# Patient Record
Sex: FEMALE | Race: WHITE | NOT HISPANIC OR LATINO | URBAN - METROPOLITAN AREA
[De-identification: names, ages, dates, MRNs, and addresses within clinical notes are randomized per-mention and may not be internally consistent; named-entity substitution may affect disease eponyms.]

---

## 2024-10-01 ENCOUNTER — EMERGENCY (EMERGENCY)
Facility: HOSPITAL | Age: 19
LOS: 0 days | Discharge: HOME | End: 2024-10-02
Attending: EMERGENCY MEDICINE
Payer: MEDICAID

## 2024-10-01 VITALS
DIASTOLIC BLOOD PRESSURE: 64 MMHG | TEMPERATURE: 99 F | RESPIRATION RATE: 18 BRPM | WEIGHT: 119.93 LBS | SYSTOLIC BLOOD PRESSURE: 105 MMHG | OXYGEN SATURATION: 99 % | HEIGHT: 66 IN | HEART RATE: 118 BPM

## 2024-10-01 DIAGNOSIS — K56.1 INTUSSUSCEPTION: ICD-10-CM

## 2024-10-01 DIAGNOSIS — R11.2 NAUSEA WITH VOMITING, UNSPECIFIED: ICD-10-CM

## 2024-10-01 DIAGNOSIS — R19.7 DIARRHEA, UNSPECIFIED: ICD-10-CM

## 2024-10-01 LAB
ALBUMIN SERPL ELPH-MCNC: 4.8 G/DL — SIGNIFICANT CHANGE UP (ref 3.5–5.2)
ALP SERPL-CCNC: 67 U/L — SIGNIFICANT CHANGE UP (ref 30–115)
ALT FLD-CCNC: 11 U/L — LOW (ref 14–37)
ANION GAP SERPL CALC-SCNC: 11 MMOL/L — SIGNIFICANT CHANGE UP (ref 7–14)
AST SERPL-CCNC: 16 U/L — SIGNIFICANT CHANGE UP (ref 14–37)
BASOPHILS # BLD AUTO: 0.01 K/UL — SIGNIFICANT CHANGE UP (ref 0–0.2)
BASOPHILS NFR BLD AUTO: 0.1 % — SIGNIFICANT CHANGE UP (ref 0–1)
BILIRUB SERPL-MCNC: 1 MG/DL — SIGNIFICANT CHANGE UP (ref 0.2–1.2)
BUN SERPL-MCNC: 14 MG/DL — SIGNIFICANT CHANGE UP (ref 10–20)
CALCIUM SERPL-MCNC: 9.2 MG/DL — SIGNIFICANT CHANGE UP (ref 8.4–10.5)
CHLORIDE SERPL-SCNC: 97 MMOL/L — LOW (ref 98–110)
CO2 SERPL-SCNC: 28 MMOL/L — SIGNIFICANT CHANGE UP (ref 17–32)
CREAT SERPL-MCNC: 0.8 MG/DL — SIGNIFICANT CHANGE UP (ref 0.3–1)
EGFR: 109 ML/MIN/1.73M2 — SIGNIFICANT CHANGE UP
EOSINOPHIL # BLD AUTO: 0.09 K/UL — SIGNIFICANT CHANGE UP (ref 0–0.7)
EOSINOPHIL NFR BLD AUTO: 1 % — SIGNIFICANT CHANGE UP (ref 0–8)
GLUCOSE SERPL-MCNC: 96 MG/DL — SIGNIFICANT CHANGE UP (ref 70–99)
HCT VFR BLD CALC: 37.7 % — SIGNIFICANT CHANGE UP (ref 37–47)
HGB BLD-MCNC: 12.6 G/DL — SIGNIFICANT CHANGE UP (ref 12–16)
IMM GRANULOCYTES NFR BLD AUTO: 0.2 % — SIGNIFICANT CHANGE UP (ref 0.1–0.3)
LYMPHOCYTES # BLD AUTO: 0.95 K/UL — LOW (ref 1.2–3.4)
LYMPHOCYTES # BLD AUTO: 10.8 % — LOW (ref 20.5–51.1)
MCHC RBC-ENTMCNC: 27.6 PG — SIGNIFICANT CHANGE UP (ref 27–31)
MCHC RBC-ENTMCNC: 33.4 G/DL — SIGNIFICANT CHANGE UP (ref 32–37)
MCV RBC AUTO: 82.7 FL — SIGNIFICANT CHANGE UP (ref 81–99)
MONOCYTES # BLD AUTO: 0.53 K/UL — SIGNIFICANT CHANGE UP (ref 0.1–0.6)
MONOCYTES NFR BLD AUTO: 6 % — SIGNIFICANT CHANGE UP (ref 1.7–9.3)
NEUTROPHILS # BLD AUTO: 7.19 K/UL — HIGH (ref 1.4–6.5)
NEUTROPHILS NFR BLD AUTO: 81.9 % — HIGH (ref 42.2–75.2)
NRBC # BLD: 0 /100 WBCS — SIGNIFICANT CHANGE UP (ref 0–0)
PLATELET # BLD AUTO: 333 K/UL — SIGNIFICANT CHANGE UP (ref 130–400)
PMV BLD: 9.5 FL — SIGNIFICANT CHANGE UP (ref 7.4–10.4)
POTASSIUM SERPL-MCNC: 3.7 MMOL/L — SIGNIFICANT CHANGE UP (ref 3.5–5)
POTASSIUM SERPL-SCNC: 3.7 MMOL/L — SIGNIFICANT CHANGE UP (ref 3.5–5)
PROT SERPL-MCNC: 7.6 G/DL — SIGNIFICANT CHANGE UP (ref 6.1–8)
RBC # BLD: 4.56 M/UL — SIGNIFICANT CHANGE UP (ref 4.2–5.4)
RBC # FLD: 13.2 % — SIGNIFICANT CHANGE UP (ref 11.5–14.5)
SODIUM SERPL-SCNC: 136 MMOL/L — SIGNIFICANT CHANGE UP (ref 135–146)
WBC # BLD: 8.79 K/UL — SIGNIFICANT CHANGE UP (ref 4.8–10.8)
WBC # FLD AUTO: 8.79 K/UL — SIGNIFICANT CHANGE UP (ref 4.8–10.8)

## 2024-10-01 PROCEDURE — 96375 TX/PRO/DX INJ NEW DRUG ADDON: CPT

## 2024-10-01 PROCEDURE — 84703 CHORIONIC GONADOTROPIN ASSAY: CPT

## 2024-10-01 PROCEDURE — 99285 EMERGENCY DEPT VISIT HI MDM: CPT | Mod: 25

## 2024-10-01 PROCEDURE — 74177 CT ABD & PELVIS W/CONTRAST: CPT | Mod: MC

## 2024-10-01 PROCEDURE — 80053 COMPREHEN METABOLIC PANEL: CPT

## 2024-10-01 PROCEDURE — 36415 COLL VENOUS BLD VENIPUNCTURE: CPT

## 2024-10-01 PROCEDURE — 96374 THER/PROPH/DIAG INJ IV PUSH: CPT | Mod: XU

## 2024-10-01 PROCEDURE — 99284 EMERGENCY DEPT VISIT MOD MDM: CPT | Mod: 25

## 2024-10-01 PROCEDURE — 85025 COMPLETE CBC W/AUTO DIFF WBC: CPT

## 2024-10-01 PROCEDURE — 83690 ASSAY OF LIPASE: CPT

## 2024-10-01 RX ORDER — SODIUM CHLORIDE 9 MG/ML
1000 INJECTION INTRAMUSCULAR; INTRAVENOUS; SUBCUTANEOUS ONCE
Refills: 0 | Status: COMPLETED | OUTPATIENT
Start: 2024-10-01 | End: 2024-10-01

## 2024-10-01 RX ORDER — ONDANSETRON 2 MG/ML
4 INJECTION, SOLUTION INTRAMUSCULAR; INTRAVENOUS ONCE
Refills: 0 | Status: COMPLETED | OUTPATIENT
Start: 2024-10-01 | End: 2024-10-01

## 2024-10-01 RX ORDER — KETOROLAC TROMETHAMINE 30 MG/ML
15 INJECTION, SOLUTION INTRAMUSCULAR ONCE
Refills: 0 | Status: DISCONTINUED | OUTPATIENT
Start: 2024-10-01 | End: 2024-10-01

## 2024-10-01 RX ADMIN — KETOROLAC TROMETHAMINE 15 MILLIGRAM(S): 30 INJECTION, SOLUTION INTRAMUSCULAR at 23:37

## 2024-10-01 RX ADMIN — ONDANSETRON 4 MILLIGRAM(S): 2 INJECTION, SOLUTION INTRAMUSCULAR; INTRAVENOUS at 23:37

## 2024-10-01 RX ADMIN — SODIUM CHLORIDE 1000 MILLILITER(S): 9 INJECTION INTRAMUSCULAR; INTRAVENOUS; SUBCUTANEOUS at 23:38

## 2024-10-01 NOTE — ED PROVIDER NOTE - CARE PROVIDER_API CALL
Huang Arnold  Surgery  [address] [place] [id number]  Phone: [phone]  Fax: [phone]  Follow Up Time:

## 2024-10-01 NOTE — ED PROVIDER NOTE - OBJECTIVE STATEMENT
18 y.o. female no PMH presenting with diffuse abdominal pain that onset this morning with associated nausea, 5 episodes of vomiting, diarrhea, and chills. Patient states she took Tylenol with no relief of pain. Has associated weakness and decreased appetite. Patient denies any sick contacts or recent travel.

## 2024-10-01 NOTE — ED PROVIDER NOTE - PATIENT PORTAL LINK FT
You can access the FollowMyHealth Patient Portal offered by [facility] by registering at the following website: [email or web address]. By joining [employer]’s FollowMyHealth portal, you will also be able to view your health information using other applications (apps) compatible with our system.

## 2024-10-01 NOTE — ED PROVIDER NOTE - CLINICAL SUMMARY MEDICAL DECISION MAKING FREE TEXT BOX
18-year-old female presents with abdominal pain since this morning associate with nausea vomiting x 5 diarrhea chills.  Patient felt better after symptomatic treatment however on repeat abdominal exam patient was tender right lower quadrant left lower quadrant mild tolerated p.o.,  CTAP Short segment intussusception, small bowel in the mid left abdomen. Otherwise, no CT evidence of an acute intra-abdominal abnormality.  surgery consulted - will reeval I in ED

## 2024-10-01 NOTE — ED PROVIDER NOTE - PHYSICAL EXAMINATION
Physical Exam    Vital Signs: I have reviewed the initial vital signs.  Constitutional: well-nourished, appears stated age, no acute distress  Eyes: Conjunctiva pink, Sclera clear, PERRLA, EOMI.  Cardiovascular: S1 and S2, regular rate, regular rhythm, well-perfused extremities, radial pulses equal and 2+  Respiratory: unlabored respiratory effort, clear to auscultation bilaterally no wheezing, rales and rhonchi  Gastrointestinal: soft, diffusely tender abdomen, no pulsatile mass, normal bowel sounds  Musculoskeletal: supple neck, no lower extremity edema, no midline tenderness  Integumentary: warm, dry, no rash  Neurologic: awake, alert, cranial nerves II-XII grossly intact, extremities’ motor and sensory functions grossly intact  Psychiatric: appropriate mood, appropriate affect

## 2024-10-01 NOTE — ED PROVIDER NOTE - PROGRESS NOTE DETAILS
Patient reports improved nausea tolerating PO intake. Patient now reporting abdominal pain localized to the RLQ, Will obtain CT Abdomen and Pelvis. Seen by surgery BARB Golden discussed with attending Donny, patient still with mild tenderness.   He recommends keeping patient in the emergency room , and surgery will reevaluate if  will admit

## 2024-10-02 VITALS
HEART RATE: 99 BPM | SYSTOLIC BLOOD PRESSURE: 110 MMHG | DIASTOLIC BLOOD PRESSURE: 70 MMHG | RESPIRATION RATE: 18 BRPM | OXYGEN SATURATION: 100 %

## 2024-10-02 LAB
HCG SERPL QL: NEGATIVE — SIGNIFICANT CHANGE UP
LIDOCAIN IGE QN: 32 U/L — SIGNIFICANT CHANGE UP (ref 7–60)

## 2024-10-02 PROCEDURE — 74177 CT ABD & PELVIS W/CONTRAST: CPT | Mod: 26,MC

## 2024-10-02 PROCEDURE — 99223 1ST HOSP IP/OBS HIGH 75: CPT

## 2024-10-02 RX ORDER — IOHEXOL 350 MG/ML
30 INJECTION, SOLUTION INTRAVENOUS ONCE
Refills: 0 | Status: COMPLETED | OUTPATIENT
Start: 2024-10-02 | End: 2024-10-02

## 2024-10-02 RX ADMIN — IOHEXOL 30 MILLILITER(S): 350 INJECTION, SOLUTION INTRAVENOUS at 00:56

## 2024-10-02 NOTE — CONSULT NOTE ADULT - NS ATTEND AMEND GEN_ALL_CORE FT
The patient has some mild abdominal pain but it sounds like she has gastroenteritis.  I do not think there is any surgical indication at this time and there is no specific reason she should have IV antibiotics although this may be a consideration.  She reports to me a history of some mild indigestion but has not had any further workup.  I personally reviewed the CT images and discussed the case with the radiologist.  It certainly appears to be a small transient occurrence and there is no evidence of obstruction.  This can be a typical finding with gastroenteritis.  I do not think that any more studies will change what we do.  She should undergo a diet trial and if that is okay then she may safely be discharged with follow-up in primary care and with myself if needed.  She may benefit from a GI workup and possibly even capsule endoscopy however if there is evidence of obstruction then capsule endoscopy may not be a wise choice.

## 2024-10-02 NOTE — ED ADULT NURSE NOTE - NSFALLUNIVINTERV_ED_ALL_ED
Bed/Stretcher in lowest position, wheels locked, appropriate side rails in place/Call bell, personal items and telephone in reach/Instruct patient to call for assistance before getting out of bed/chair/stretcher/Non-slip footwear applied when patient is off stretcher/[place] to call system/Physically safe environment - no spills, clutter or unnecessary equipment/Purposeful proactive rounding/Room/bathroom lighting operational, light cord in reach

## 2024-10-02 NOTE — CONSULT NOTE ADULT - ASSESSMENT
18 y.o. female with no Sig. PMH presenting with diffuse abdominal pain that started around  9am yesterday with associated nausea, 5 episodes of vomiting, diarrhea, and chills. Patient states she took Tylenol with no relief of pain. Has associated weakness and decreased appetite. Patient denies fever, any sick contacts or recent travel. Pt also had one BM at the ED (diarrhea). Father at bedside.   Pt states, pain has gotten better after pain med but  on exam, mostly LLQ and lower mid abdomen below the umbilicus.     At the ED, work up showed; WBC 8.7, Afebrile, CT A/P w/ PO/IV cont: Short segment intussusception, small bowel in the mid left abdomen.    A/P:  Discussed with Dr. Arnold.  - NPO  - IV hydration  - Will keep at the ED for an observation   - Serial abdominal exam   - Surgery will re-examine pt

## 2024-10-02 NOTE — CONSULT NOTE ADULT - SUBJECTIVE AND OBJECTIVE BOX
18 y.o. female with no Sig. PMH presenting with diffuse abdominal pain that started around  9am yesterday with associated nausea, 5 episodes of vomiting, diarrhea, and chills. Patient states she took Tylenol with no relief of pain. Has associated weakness and decreased appetite. Patient denies fever, any sick contacts or recent travel. Pt also had one BM at the ED (diarrhea). Father at bedside.     Vital Signs Last 24 Hrs  T(C): 36.9 (02 Oct 2024 06:15), Max: 37.1 (01 Oct 2024 22:03)  T(F): 98.4 (02 Oct 2024 06:15), Max: 98.7 (01 Oct 2024 22:03)  HR: 100 (02 Oct 2024 06:15) (100 - 118)  BP: 100/69 (02 Oct 2024 06:15) (100/69 - 105/64)  BP(mean): --  RR: 18 (01 Oct 2024 22:03) (18 - 18)  SpO2: 100% (02 Oct 2024 06:15) (99% - 100%)    Parameters below as of 01 Oct 2024 22:03  Patient On (Oxygen Delivery Method): room air      PHYSICAL EXAM:      Constitutional: NAD, A&O x3    Eyes: PERRLA, no conjuctivitis    Neck: no lymphadenopathy    Respiratory: +air entry, no rales, no rhonchi, no wheezes    Cardiovascular: +S1 and S2, regular rate and rhythm    Gastrointestinal: +BS, soft, mildly tender LLQ>RLQ and mid lower abdomen, mildly distended    Extremities:  no edema, no calf tenderness    Vascular: +dorsal pedis and radial pulses, no extremity cyanosis    Neurological: sensation intact, ROM equal B/L, CN II-XII intact    Skin: no rashes, normal turgor                            12.6   8.79  )-----------( 333      ( 01 Oct 2024 23:18 )             37.7     10-01    136  |  97[L]  |  14  ----------------------------<  96  3.7   |  28  |  0.8    Ca    9.2      01 Oct 2024 23:18    TPro  7.6  /  Alb  4.8  /  TBili  1.0  /  DBili  x   /  AST  16  /  ALT  11[L]  /  AlkPhos  67  10-01          Urinalysis Basic - ( 01 Oct 2024 23:18 )    Color: x / Appearance: x / SG: x / pH: x  Gluc: 96 mg/dL / Ketone: x  / Bili: x / Urobili: x   Blood: x / Protein: x / Nitrite: x   Leuk Esterase: x / RBC: x / WBC x   Sq Epi: x / Non Sq Epi: x / Bacteria: x            < from: CT Abdomen and Pelvis w/ Oral Cont and w/ IV Cont (10.02.24 @ 03:41) >    IMPRESSION:    Short segment intussusception, small bowel in the mid left abdomen.    Otherwise, no CT evidence of an acute intra-abdominal abnormality.    < end of copied text >                       18 y.o. female with no Sig. PMH presenting with diffuse abdominal pain that started around  9am yesterday with associated nausea, 5 episodes of vomiting, diarrhea, and chills. Patient states she took Tylenol with no relief of pain. Has associated weakness and decreased appetite. Patient denies fever, any sick contacts or recent travel. Pt also had one BM at the ED (diarrhea). Father at bedside. Currently she feels much better.  No more nausea.  Currently she feels much better.  No more nausea.  Vital Signs Last 24 Hrs  T(C): 36.9 (02 Oct 2024 06:15), Max: 37.1 (01 Oct 2024 22:03)  T(F): 98.4 (02 Oct 2024 06:15), Max: 98.7 (01 Oct 2024 22:03)  HR: 100 (02 Oct 2024 06:15) (100 - 118)  BP: 100/69 (02 Oct 2024 06:15) (100/69 - 105/64)  BP(mean): --  RR: 18 (01 Oct 2024 22:03) (18 - 18)  SpO2: 100% (02 Oct 2024 06:15) (99% - 100%)    Parameters below as of 01 Oct 2024 22:03  Patient On (Oxygen Delivery Method): room air      PHYSICAL EXAM:      Constitutional: NAD, A&O x3    Eyes: PERRLA, no conjuctivitis    Neck: no lymphadenopathy    Respiratory: +air entry, no rales, no rhonchi, no wheezes    Cardiovascular: +S1 and S2, regular rate and rhythm    Gastrointestinal: +BS, soft, mildly tender LLQ>RLQ and mid lower abdomen, mildly distended    Extremities:  no edema, no calf tenderness    Vascular: +dorsal pedis and radial pulses, no extremity cyanosis    Neurological: sensation intact, ROM equal B/L, CN II-XII intact    Skin: no rashes, normal turgor                            12.6   8.79  )-----------( 333      ( 01 Oct 2024 23:18 )             37.7     10-01    136  |  97[L]  |  14  ----------------------------<  96  3.7   |  28  |  0.8    Ca    9.2      01 Oct 2024 23:18    TPro  7.6  /  Alb  4.8  /  TBili  1.0  /  DBili  x   /  AST  16  /  ALT  11[L]  /  AlkPhos  67  10-01          Urinalysis Basic - ( 01 Oct 2024 23:18 )    Color: x / Appearance: x / SG: x / pH: x  Gluc: 96 mg/dL / Ketone: x  / Bili: x / Urobili: x   Blood: x / Protein: x / Nitrite: x   Leuk Esterase: x / RBC: x / WBC x   Sq Epi: x / Non Sq Epi: x / Bacteria: x            < from: CT Abdomen and Pelvis w/ Oral Cont and w/ IV Cont (10.02.24 @ 03:41) >    IMPRESSION:    Short segment intussusception, small bowel in the mid left abdomen.    Otherwise, no CT evidence of an acute intra-abdominal abnormality.    < end of copied text >

## 2024-10-15 ENCOUNTER — EMERGENCY (EMERGENCY)
Facility: HOSPITAL | Age: 19
LOS: 0 days | Discharge: HOME | End: 2024-10-16
Attending: EMERGENCY MEDICINE
Payer: MEDICAID

## 2024-10-15 VITALS
HEART RATE: 113 BPM | DIASTOLIC BLOOD PRESSURE: 72 MMHG | WEIGHT: 89.95 LBS | TEMPERATURE: 99 F | SYSTOLIC BLOOD PRESSURE: 106 MMHG | HEIGHT: 66 IN | OXYGEN SATURATION: 99 % | RESPIRATION RATE: 20 BRPM

## 2024-10-15 DIAGNOSIS — N83.201 UNSPECIFIED OVARIAN CYST, RIGHT SIDE: ICD-10-CM

## 2024-10-15 DIAGNOSIS — R10.9 UNSPECIFIED ABDOMINAL PAIN: ICD-10-CM

## 2024-10-15 LAB
ALBUMIN SERPL ELPH-MCNC: 4.9 G/DL — SIGNIFICANT CHANGE UP (ref 3.5–5.2)
ALP SERPL-CCNC: 55 U/L — SIGNIFICANT CHANGE UP (ref 30–115)
ALT FLD-CCNC: 9 U/L — LOW (ref 14–37)
ANION GAP SERPL CALC-SCNC: 10 MMOL/L — SIGNIFICANT CHANGE UP (ref 7–14)
APPEARANCE UR: CLEAR — SIGNIFICANT CHANGE UP
AST SERPL-CCNC: 15 U/L — SIGNIFICANT CHANGE UP (ref 14–37)
BACTERIA # UR AUTO: ABNORMAL /HPF
BASOPHILS # BLD AUTO: 0.06 K/UL — SIGNIFICANT CHANGE UP (ref 0–0.2)
BASOPHILS NFR BLD AUTO: 0.5 % — SIGNIFICANT CHANGE UP (ref 0–1)
BILIRUB DIRECT SERPL-MCNC: 0.3 MG/DL — SIGNIFICANT CHANGE UP (ref 0–0.3)
BILIRUB INDIRECT FLD-MCNC: 0.5 MG/DL — SIGNIFICANT CHANGE UP (ref 0.2–1.2)
BILIRUB SERPL-MCNC: 0.8 MG/DL — SIGNIFICANT CHANGE UP (ref 0.2–1.2)
BILIRUB UR-MCNC: NEGATIVE — SIGNIFICANT CHANGE UP
BUN SERPL-MCNC: 7 MG/DL — LOW (ref 10–20)
CALCIUM SERPL-MCNC: 9.6 MG/DL — SIGNIFICANT CHANGE UP (ref 8.4–10.5)
CHLORIDE SERPL-SCNC: 100 MMOL/L — SIGNIFICANT CHANGE UP (ref 98–110)
CO2 SERPL-SCNC: 28 MMOL/L — SIGNIFICANT CHANGE UP (ref 17–32)
COLOR SPEC: YELLOW — SIGNIFICANT CHANGE UP
CREAT SERPL-MCNC: 0.5 MG/DL — SIGNIFICANT CHANGE UP (ref 0.3–1)
DIFF PNL FLD: ABNORMAL
EGFR: 138 ML/MIN/1.73M2 — SIGNIFICANT CHANGE UP
EOSINOPHIL # BLD AUTO: 0.18 K/UL — SIGNIFICANT CHANGE UP (ref 0–0.7)
EOSINOPHIL NFR BLD AUTO: 1.5 % — SIGNIFICANT CHANGE UP (ref 0–8)
EPI CELLS # UR: PRESENT
GLUCOSE SERPL-MCNC: 91 MG/DL — SIGNIFICANT CHANGE UP (ref 70–99)
GLUCOSE UR QL: NEGATIVE MG/DL — SIGNIFICANT CHANGE UP
HCG SERPL QL: NEGATIVE — SIGNIFICANT CHANGE UP
HCT VFR BLD CALC: 36.5 % — LOW (ref 37–47)
HGB BLD-MCNC: 12.2 G/DL — SIGNIFICANT CHANGE UP (ref 12–16)
IMM GRANULOCYTES NFR BLD AUTO: 0.3 % — SIGNIFICANT CHANGE UP (ref 0.1–0.3)
KETONES UR-MCNC: NEGATIVE MG/DL — SIGNIFICANT CHANGE UP
LEUKOCYTE ESTERASE UR-ACNC: ABNORMAL
LIDOCAIN IGE QN: 38 U/L — SIGNIFICANT CHANGE UP (ref 7–60)
LYMPHOCYTES # BLD AUTO: 29.5 % — SIGNIFICANT CHANGE UP (ref 20.5–51.1)
LYMPHOCYTES # BLD AUTO: 3.64 K/UL — HIGH (ref 1.2–3.4)
MCHC RBC-ENTMCNC: 27.4 PG — SIGNIFICANT CHANGE UP (ref 27–31)
MCHC RBC-ENTMCNC: 33.4 G/DL — SIGNIFICANT CHANGE UP (ref 32–37)
MCV RBC AUTO: 82 FL — SIGNIFICANT CHANGE UP (ref 81–99)
MONOCYTES # BLD AUTO: 1.03 K/UL — HIGH (ref 0.1–0.6)
MONOCYTES NFR BLD AUTO: 8.3 % — SIGNIFICANT CHANGE UP (ref 1.7–9.3)
NEUTROPHILS # BLD AUTO: 7.39 K/UL — HIGH (ref 1.4–6.5)
NEUTROPHILS NFR BLD AUTO: 59.9 % — SIGNIFICANT CHANGE UP (ref 42.2–75.2)
NITRITE UR-MCNC: NEGATIVE — SIGNIFICANT CHANGE UP
NRBC # BLD: 0 /100 WBCS — SIGNIFICANT CHANGE UP (ref 0–0)
PH UR: 7 — SIGNIFICANT CHANGE UP (ref 5–8)
PLATELET # BLD AUTO: 385 K/UL — SIGNIFICANT CHANGE UP (ref 130–400)
PMV BLD: 9.7 FL — SIGNIFICANT CHANGE UP (ref 7.4–10.4)
POTASSIUM SERPL-MCNC: 3.7 MMOL/L — SIGNIFICANT CHANGE UP (ref 3.5–5)
POTASSIUM SERPL-SCNC: 3.7 MMOL/L — SIGNIFICANT CHANGE UP (ref 3.5–5)
PROT SERPL-MCNC: 7.7 G/DL — SIGNIFICANT CHANGE UP (ref 6.1–8)
PROT UR-MCNC: NEGATIVE MG/DL — SIGNIFICANT CHANGE UP
RBC # BLD: 4.45 M/UL — SIGNIFICANT CHANGE UP (ref 4.2–5.4)
RBC # FLD: 12.9 % — SIGNIFICANT CHANGE UP (ref 11.5–14.5)
RBC CASTS # UR COMP ASSIST: 3 /HPF — SIGNIFICANT CHANGE UP (ref 0–4)
SODIUM SERPL-SCNC: 138 MMOL/L — SIGNIFICANT CHANGE UP (ref 135–146)
SP GR SPEC: 1.01 — SIGNIFICANT CHANGE UP (ref 1–1.03)
UROBILINOGEN FLD QL: 0.2 MG/DL — SIGNIFICANT CHANGE UP (ref 0.2–1)
WBC # BLD: 12.34 K/UL — HIGH (ref 4.8–10.8)
WBC # FLD AUTO: 12.34 K/UL — HIGH (ref 4.8–10.8)
WBC UR QL: 5 /HPF — SIGNIFICANT CHANGE UP (ref 0–5)

## 2024-10-15 PROCEDURE — 99284 EMERGENCY DEPT VISIT MOD MDM: CPT | Mod: 25

## 2024-10-15 PROCEDURE — 96375 TX/PRO/DX INJ NEW DRUG ADDON: CPT

## 2024-10-15 PROCEDURE — 99285 EMERGENCY DEPT VISIT HI MDM: CPT | Mod: 25

## 2024-10-15 PROCEDURE — 84703 CHORIONIC GONADOTROPIN ASSAY: CPT

## 2024-10-15 PROCEDURE — 83690 ASSAY OF LIPASE: CPT

## 2024-10-15 PROCEDURE — 80076 HEPATIC FUNCTION PANEL: CPT

## 2024-10-15 PROCEDURE — 96374 THER/PROPH/DIAG INJ IV PUSH: CPT | Mod: XU

## 2024-10-15 PROCEDURE — 80048 BASIC METABOLIC PNL TOTAL CA: CPT

## 2024-10-15 PROCEDURE — 36415 COLL VENOUS BLD VENIPUNCTURE: CPT

## 2024-10-15 PROCEDURE — 81001 URINALYSIS AUTO W/SCOPE: CPT

## 2024-10-15 PROCEDURE — 85025 COMPLETE CBC W/AUTO DIFF WBC: CPT

## 2024-10-15 PROCEDURE — 74177 CT ABD & PELVIS W/CONTRAST: CPT | Mod: MC

## 2024-10-15 RX ORDER — IOHEXOL 350 MG/ML
30 INJECTION, SOLUTION INTRAVENOUS ONCE
Refills: 0 | Status: COMPLETED | OUTPATIENT
Start: 2024-10-15 | End: 2024-10-15

## 2024-10-15 RX ORDER — KETOROLAC TROMETHAMINE 10 MG/1
15 TABLET, FILM COATED ORAL ONCE
Refills: 0 | Status: DISCONTINUED | OUTPATIENT
Start: 2024-10-15 | End: 2024-10-15

## 2024-10-15 RX ORDER — SODIUM CHLORIDE 0.9 % (FLUSH) 0.9 %
1000 SYRINGE (ML) INJECTION ONCE
Refills: 0 | Status: COMPLETED | OUTPATIENT
Start: 2024-10-15 | End: 2024-10-15

## 2024-10-15 RX ORDER — ONDANSETRON HCL/PF 4 MG/2 ML
4 VIAL (ML) INJECTION ONCE
Refills: 0 | Status: COMPLETED | OUTPATIENT
Start: 2024-10-15 | End: 2024-10-15

## 2024-10-15 RX ADMIN — Medication 1000 MILLILITER(S): at 22:54

## 2024-10-15 RX ADMIN — KETOROLAC TROMETHAMINE 15 MILLIGRAM(S): 10 TABLET, FILM COATED ORAL at 22:53

## 2024-10-15 RX ADMIN — IOHEXOL 30 MILLILITER(S): 350 INJECTION, SOLUTION INTRAVENOUS at 22:53

## 2024-10-15 RX ADMIN — Medication 4 MILLIGRAM(S): at 22:54

## 2024-10-15 NOTE — ED PROVIDER NOTE - NSFOLLOWUPINSTRUCTIONS_ED_ALL_ED_FT
Follow up with your primary care doctor and your GI doctor at scheduled appointment    Acute Abdominal Pain    WHAT YOU NEED TO KNOW:    The cause of your abdominal pain may not be found. If a cause is found, treatment will depend on what the cause is.     DISCHARGE INSTRUCTIONS:    Return to the emergency department if:     You vomit blood or cannot stop vomiting.      You have blood in your bowel movement or it looks like tar.       You have bleeding from your rectum.       Your abdomen is larger than usual, more painful, and hard.       You have severe pain in your abdomen.       You stop passing gas and having bowel movements.       You feel weak, dizzy, or faint.    Contact your healthcare provider if:     You have a fever.      You have new signs and symptoms.      Your symptoms do not get better with treatment.       You have questions or concerns about your condition or care.    Medicines may be given to decrease pain, treat an infection, and manage your symptoms. Take your medicine as directed. Call your healthcare provider if you think your medicine is not helping or if you have side effects. Tell him if you are allergic to any medicine. Keep a list of the medicines, vitamins, and herbs you take. Include the amounts, and when and why you take them. Bring the list or the pill bottles to follow-up visits. Carry your medicine list with you in case of an emergency.    Manage your symptoms:     Apply heat on your abdomen for 20 to 30 minutes every 2 hours for as many days as directed. Heat helps decrease pain and muscle spasms.       Manage your stress. Stress may cause abdominal pain. Your healthcare provider may recommend relaxation techniques and deep breathing exercises to help decrease your stress. Your healthcare provider may recommend you talk to someone about your stress or anxiety, such as a counselor or a trusted friend. Get plenty of sleep and exercise regularly.       Limit or do not drink alcohol. Alcohol can make your abdominal pain worse. Ask your healthcare provider if it is safe for you to drink alcohol. Also ask how much is safe for you to drink.       Do not smoke. Nicotine and other chemicals in cigarettes can damage your esophagus and stomach. Ask your healthcare provider for information if you currently smoke and need help to quit. E-cigarettes or smokeless tobacco still contain nicotine. Talk to your healthcare provider before you use these products.     Make changes to the food you eat as directed: Do not eat foods that cause abdominal pain or other symptoms. Eat small meals more often.     Eat more high-fiber foods if you are constipated. High-fiber foods include fruits, vegetables, whole-grain foods, and legumes.       Do not eat foods that cause gas if you have bloating. Examples include broccoli, cabbage, and cauliflower. Do not drink soda or carbonated drinks, because these may also cause gas.       Do not eat foods or drinks that contain sorbitol or fructose if you have diarrhea and bloating. Some examples are fruit juices, candy, jelly, and sugar-free gum.       Do not eat high-fat foods, such as fried foods, cheeseburgers, hot dogs, and desserts.      Limit or do not drink caffeine. Caffeine may make symptoms, such as heart burn or nausea, worse.       Drink plenty of liquids to prevent dehydration from diarrhea or vomiting. Ask your healthcare provider how much liquid to drink each day and which liquids are best for you.     Follow up with your healthcare provider as directed: Write down your questions so you remember to ask them during your visits.       © Copyright [employer] 2019 All illustrations and images included in CareNotes are the copyrighted property of [employer] Inc[employer] or [employer]      Ovarian Cyst    An ovarian cyst is a fluid-filled sac that forms on an ovary. The ovaries are small organs that produce eggs in women. Various types of cysts can form on the ovaries. Most are not cancerous. Many do not cause problems, and they often go away on their own. Some may cause symptoms and require treatment. Common types of ovarian cysts include:     Functional cysts—These cysts may occur every month during the menstrual cycle. This is normal. The cysts usually go away with the next menstrual cycle if the woman does not get pregnant. Usually, there are no symptoms with a functional cyst.  Endometrioma cysts—These cysts form from the tissue that lines the uterus. They are also called "chocolate cysts" because they become filled with blood that turns brown. This type of cyst can cause pain in the lower abdomen during intercourse and with your menstrual period.  Cystadenoma cysts—This type develops from the cells on the outside of the ovary. These cysts can get very big and cause lower abdomen pain and pain with intercourse. This type of cyst can twist on itself, cut off its blood supply, and cause severe pain. It can also easily rupture and cause a lot of pain.  Dermoid cysts—This type of cyst is sometimes found in both ovaries. These cysts may contain different kinds of body tissue, such as skin, teeth, hair, or cartilage. They usually do not cause symptoms unless they get very big.   Theca lutein cysts—These cysts occur when too much of a certain hormone (human chorionic gonadotropin) is produced and overstimulates the ovaries to produce an egg. This is most common after procedures used to assist with the conception of a baby (in vitro fertilization).    CAUSES  Fertility drugs can cause a condition in which multiple large cysts are formed on the ovaries. This is called ovarian hyperstimulation syndrome.   A condition called polycystic ovary syndrome can cause hormonal imbalances that can lead to nonfunctional ovarian cysts.     SIGNS AND SYMPTOMS  Many ovarian cysts do not cause symptoms. If symptoms are present, they may include:    Pelvic pain or pressure.  Pain in the lower abdomen.  Pain during sexual intercourse.  Increasing girth (swelling) of the abdomen.  Abnormal menstrual periods.  Increasing pain with menstrual periods.  Stopping having menstrual periods without being pregnant.    DIAGNOSIS  These cysts are commonly found during a routine or annual pelvic exam. Tests may be ordered to find out more about the cyst. These tests may include:    Ultrasound.  X-ray of the pelvis.  CT scan.  MRI.  Blood tests.    TREATMENT  Many ovarian cysts go away on their own without treatment. Your health care provider may want to check your cyst regularly for 2–3 months to see if it changes. For women in menopause, it is particularly important to monitor a cyst closely because of the higher rate of ovarian cancer in menopausal women. When treatment is needed, it may include any of the following:    A procedure to drain the cyst (aspiration). This may be done using a long needle and ultrasound. It can also be done through a laparoscopic procedure. This involves using a thin, lighted tube with a tiny camera on the end (laparoscope) inserted through a small incision.   Surgery to remove the whole cyst. This may be done using laparoscopic surgery or an open surgery involving a larger incision in the lower abdomen.   Hormone treatment or birth control pills. These methods are sometimes used to help dissolve a cyst.    HOME CARE INSTRUCTIONS  Only take over-the-counter or prescription medicines as directed by your health care provider.   Follow up with your health care provider as directed.   Get regular pelvic exams and Pap tests.    SEEK MEDICAL CARE IF:  Your periods are late, irregular, or painful, or they stop.  Your pelvic pain or abdominal pain does not go away.  Your abdomen becomes larger or swollen.  You have pressure on your bladder or trouble emptying your bladder completely.  You have pain during sexual intercourse.  You have feelings of fullness, pressure, or discomfort in your stomach.  You lose weight for no apparent reason.  You feel generally ill.  You become constipated.  You lose your appetite.  You develop acne.  You have an increase in body and facial hair.  You are gaining weight, without changing your exercise and eating habits.  You think you are pregnant.    SEEK IMMEDIATE MEDICAL CARE IF:  You have increasing abdominal pain.  You feel sick to your stomach (nauseous), and you throw up (vomit).  You develop a fever that comes on suddenly.  You have abdominal pain during a bowel movement.  Your menstrual periods become heavier than usual.

## 2024-10-15 NOTE — ED PROVIDER NOTE - NSFOLLOWUPCLINICS_GEN_ALL_ED_FT
[facility]  OB/GYN  [address] [place] [address]  Phone: [phone]  Fax:

## 2024-10-15 NOTE — ED PROVIDER NOTE - PATIENT PORTAL LINK FT
You can access the FollowMyHealth Patient Portal offered by [facility] by registering at the following website: [email or web address]. By joining [employer]’s FollowMyHealth portal, you will also be able to view your health information using other applications (apps) compatible with our system.

## 2024-10-15 NOTE — ED PROVIDER NOTE - OBJECTIVE STATEMENT
18 year old Female past medical history of  2 weeks ago seen in ED for abdominal pain diagnosis with  Short segment intussusception, small bowel in the mid left abdomen evaluated by surgery -  symptoms improved  plan for out patient to follow up with GI  -  appointment in 2 days  Thursday. Pt presents today with worsening pain left sided  radiating to left flank.  decreased appetite.  Pt admits to mild  pain throughout the 2 weeks. tolerating PO,  no change in stool color no fever

## 2024-10-15 NOTE — ED ADULT TRIAGE NOTE - HEIGHT IN CM
Is she symptomatic? I looked at urine from 8/16/23 shows WBC no nitrates. Nitrates are usually positive if bacteria is present. If she has symptoms she needs a virtual at the very least and then she can give a sample.  NP ok
Patient advised. She said she is not having symptoms other than some discomfort that she thought was from riding her bike. She said she will monitor and make an appointment if necessary.
SHE IS ASKING IF DR. MARCH WILL SEND ORDERS FOR A URINE TEST TO [facility] BECAUSE HER LAST URINE TEST THAT SHE HAD DONE THROUGH THE SPEC. SHOWED POSSIBLE UTI SO HE WANTED HER TO GET IT RECHECKED.
167.64

## 2024-10-15 NOTE — ED PROVIDER NOTE - CLINICAL SUMMARY MEDICAL DECISION MAKING FREE TEXT BOX
18yF  2 weeks ago seen in ED for abdominal pain dx with  Short segment intussusception, small bowel in the mid left abdomen evaluated by surgery -  symptoms improved  plan for  outp to follow upwith  GI  -  appointment in 2 days  Thursday. presents today.with worsening pain left sided  radiating to left flank.  decreased appetite.  Pt admits to mild  pain throughout the 2 weeks. tolerating PO,  no change in stool color no fever

## 2024-10-15 NOTE — ED ADULT NURSE NOTE - NSFALLUNIVINTERV_ED_ALL_ED
Bed/Stretcher in lowest position, wheels locked, appropriate side rails in place/Call bell, personal items and telephone in reach/Instruct patient to call for assistance before getting out of bed/chair/stretcher/Non-slip footwear applied when patient is off stretcher/[place] to call system/Physically safe environment - no spills, clutter or unnecessary equipment/Purposeful proactive rounding/Room/bathroom lighting operational, light cord in reach

## 2024-10-16 VITALS
TEMPERATURE: 98 F | RESPIRATION RATE: 17 BRPM | HEART RATE: 73 BPM | SYSTOLIC BLOOD PRESSURE: 93 MMHG | OXYGEN SATURATION: 100 % | DIASTOLIC BLOOD PRESSURE: 58 MMHG

## 2024-10-16 PROCEDURE — 74177 CT ABD & PELVIS W/CONTRAST: CPT | Mod: 26,MC

## 2025-07-19 ENCOUNTER — EMERGENCY (EMERGENCY)
Facility: HOSPITAL | Age: 20
LOS: 0 days | Discharge: HOME | End: 2025-07-19
Attending: EMERGENCY MEDICINE
Payer: MEDICAID

## 2025-07-19 VITALS
DIASTOLIC BLOOD PRESSURE: 65 MMHG | HEART RATE: 98 BPM | RESPIRATION RATE: 20 BRPM | OXYGEN SATURATION: 97 % | HEIGHT: 62 IN | TEMPERATURE: 98 F | SYSTOLIC BLOOD PRESSURE: 97 MMHG | WEIGHT: 100.09 LBS

## 2025-07-19 VITALS — SYSTOLIC BLOOD PRESSURE: 104 MMHG | HEART RATE: 63 BPM | DIASTOLIC BLOOD PRESSURE: 65 MMHG

## 2025-07-19 DIAGNOSIS — R11.0 NAUSEA: ICD-10-CM

## 2025-07-19 DIAGNOSIS — N39.0 URINARY TRACT INFECTION, SITE NOT SPECIFIED: ICD-10-CM

## 2025-07-19 LAB
ALBUMIN SERPL ELPH-MCNC: 4.8 G/DL — SIGNIFICANT CHANGE UP (ref 3.5–5.2)
ALP SERPL-CCNC: 61 U/L — SIGNIFICANT CHANGE UP (ref 30–115)
ALT FLD-CCNC: 8 U/L — LOW (ref 14–37)
ANION GAP SERPL CALC-SCNC: 13 MMOL/L — SIGNIFICANT CHANGE UP (ref 7–14)
APPEARANCE UR: CLEAR — SIGNIFICANT CHANGE UP
AST SERPL-CCNC: 16 U/L — SIGNIFICANT CHANGE UP (ref 14–37)
BASOPHILS # BLD AUTO: 0.04 K/UL — SIGNIFICANT CHANGE UP (ref 0–0.2)
BASOPHILS NFR BLD AUTO: 0.5 % — SIGNIFICANT CHANGE UP (ref 0–2)
BILIRUB SERPL-MCNC: 0.6 MG/DL — SIGNIFICANT CHANGE UP (ref 0.2–1.2)
BILIRUB UR-MCNC: NEGATIVE — SIGNIFICANT CHANGE UP
BUN SERPL-MCNC: 10 MG/DL — SIGNIFICANT CHANGE UP (ref 10–20)
CALCIUM SERPL-MCNC: 9.6 MG/DL — SIGNIFICANT CHANGE UP (ref 8.4–10.5)
CHLORIDE SERPL-SCNC: 102 MMOL/L — SIGNIFICANT CHANGE UP (ref 98–110)
CO2 SERPL-SCNC: 25 MMOL/L — SIGNIFICANT CHANGE UP (ref 17–32)
COLOR SPEC: YELLOW — SIGNIFICANT CHANGE UP
CREAT SERPL-MCNC: 0.6 MG/DL — LOW (ref 0.7–1.5)
DIFF PNL FLD: ABNORMAL
EGFR: 132 ML/MIN/1.73M2 — SIGNIFICANT CHANGE UP
EGFR: 132 ML/MIN/1.73M2 — SIGNIFICANT CHANGE UP
EOSINOPHIL # BLD AUTO: 0.3 K/UL — SIGNIFICANT CHANGE UP (ref 0–0.5)
EOSINOPHIL NFR BLD AUTO: 4 % — SIGNIFICANT CHANGE UP (ref 0–6)
GLUCOSE SERPL-MCNC: 96 MG/DL — SIGNIFICANT CHANGE UP (ref 70–99)
GLUCOSE UR QL: NEGATIVE MG/DL — SIGNIFICANT CHANGE UP
HCG SERPL QL: NEGATIVE — SIGNIFICANT CHANGE UP
HCT VFR BLD CALC: 36.5 % — SIGNIFICANT CHANGE UP (ref 34.5–45)
HGB BLD-MCNC: 12.2 G/DL — SIGNIFICANT CHANGE UP (ref 11.5–15.5)
IMM GRANULOCYTES # BLD AUTO: 0.01 K/UL — SIGNIFICANT CHANGE UP (ref 0–0.07)
IMM GRANULOCYTES NFR BLD AUTO: 0.1 % — SIGNIFICANT CHANGE UP (ref 0–0.9)
KETONES UR QL: NEGATIVE MG/DL — SIGNIFICANT CHANGE UP
LEUKOCYTE ESTERASE UR-ACNC: ABNORMAL
LIDOCAIN IGE QN: 31 U/L — SIGNIFICANT CHANGE UP (ref 7–60)
LYMPHOCYTES # BLD AUTO: 2.58 K/UL — SIGNIFICANT CHANGE UP (ref 1–3.3)
LYMPHOCYTES NFR BLD AUTO: 34.7 % — SIGNIFICANT CHANGE UP (ref 13–44)
MCHC RBC-ENTMCNC: 27.5 PG — SIGNIFICANT CHANGE UP (ref 27–34)
MCHC RBC-ENTMCNC: 33.4 G/DL — SIGNIFICANT CHANGE UP (ref 32–36)
MCV RBC AUTO: 82.2 FL — SIGNIFICANT CHANGE UP (ref 80–100)
MONOCYTES # BLD AUTO: 0.56 K/UL — SIGNIFICANT CHANGE UP (ref 0–0.9)
MONOCYTES NFR BLD AUTO: 7.5 % — SIGNIFICANT CHANGE UP (ref 2–14)
NEUTROPHILS # BLD AUTO: 3.94 K/UL — SIGNIFICANT CHANGE UP (ref 1.8–7.4)
NEUTROPHILS NFR BLD AUTO: 53.2 % — SIGNIFICANT CHANGE UP (ref 43–77)
NITRITE UR-MCNC: NEGATIVE — SIGNIFICANT CHANGE UP
NRBC # BLD AUTO: 0 K/UL — SIGNIFICANT CHANGE UP (ref 0–0)
NRBC # FLD: 0 K/UL — SIGNIFICANT CHANGE UP (ref 0–0)
NRBC BLD AUTO-RTO: 0 /100 WBCS — SIGNIFICANT CHANGE UP (ref 0–0)
PH UR: 6 — SIGNIFICANT CHANGE UP (ref 5–8)
PLATELET # BLD AUTO: 331 K/UL — SIGNIFICANT CHANGE UP (ref 150–400)
PMV BLD: 10 FL — SIGNIFICANT CHANGE UP (ref 7–13)
POTASSIUM SERPL-MCNC: 4.1 MMOL/L — SIGNIFICANT CHANGE UP (ref 3.5–5)
POTASSIUM SERPL-SCNC: 4.1 MMOL/L — SIGNIFICANT CHANGE UP (ref 3.5–5)
PROT SERPL-MCNC: 7.4 G/DL — SIGNIFICANT CHANGE UP (ref 6–8)
PROT UR-MCNC: NEGATIVE MG/DL — SIGNIFICANT CHANGE UP
RBC # BLD: 4.44 M/UL — SIGNIFICANT CHANGE UP (ref 3.8–5.2)
RBC # FLD: 12.8 % — SIGNIFICANT CHANGE UP (ref 10.3–14.5)
SODIUM SERPL-SCNC: 140 MMOL/L — SIGNIFICANT CHANGE UP (ref 135–146)
SP GR SPEC: 1.02 — SIGNIFICANT CHANGE UP (ref 1–1.03)
UROBILINOGEN FLD QL: 1 MG/DL — SIGNIFICANT CHANGE UP (ref 0.2–1)
WBC # BLD: 7.43 K/UL — SIGNIFICANT CHANGE UP (ref 3.8–10.5)
WBC # FLD AUTO: 7.43 K/UL — SIGNIFICANT CHANGE UP (ref 3.8–10.5)

## 2025-07-19 PROCEDURE — 80053 COMPREHEN METABOLIC PANEL: CPT

## 2025-07-19 PROCEDURE — 36415 COLL VENOUS BLD VENIPUNCTURE: CPT

## 2025-07-19 PROCEDURE — 83690 ASSAY OF LIPASE: CPT

## 2025-07-19 PROCEDURE — 74177 CT ABD & PELVIS W/CONTRAST: CPT

## 2025-07-19 PROCEDURE — 74177 CT ABD & PELVIS W/CONTRAST: CPT | Mod: 26

## 2025-07-19 PROCEDURE — 99285 EMERGENCY DEPT VISIT HI MDM: CPT

## 2025-07-19 PROCEDURE — 81001 URINALYSIS AUTO W/SCOPE: CPT

## 2025-07-19 PROCEDURE — 85025 COMPLETE CBC W/AUTO DIFF WBC: CPT

## 2025-07-19 PROCEDURE — 96375 TX/PRO/DX INJ NEW DRUG ADDON: CPT

## 2025-07-19 PROCEDURE — 96374 THER/PROPH/DIAG INJ IV PUSH: CPT | Mod: XU

## 2025-07-19 PROCEDURE — 84703 CHORIONIC GONADOTROPIN ASSAY: CPT

## 2025-07-19 PROCEDURE — 99284 EMERGENCY DEPT VISIT MOD MDM: CPT | Mod: 25

## 2025-07-19 PROCEDURE — 87086 URINE CULTURE/COLONY COUNT: CPT

## 2025-07-19 RX ORDER — CEPHALEXIN 250 MG/1
1 CAPSULE ORAL
Qty: 10 | Refills: 0
Start: 2025-07-19 | End: 2025-07-23

## 2025-07-19 RX ORDER — IOHEXOL 350 MG/ML
30 INJECTION, SOLUTION INTRAVENOUS ONCE
Refills: 0 | Status: COMPLETED | OUTPATIENT
Start: 2025-07-19 | End: 2025-07-19

## 2025-07-19 RX ORDER — SODIUM CHLORIDE 9 G/1000ML
1400 INJECTION, SOLUTION INTRAVENOUS ONCE
Refills: 0 | Status: COMPLETED | OUTPATIENT
Start: 2025-07-19 | End: 2025-07-19

## 2025-07-19 RX ORDER — ONDANSETRON HCL/PF 4 MG/2 ML
4 VIAL (ML) INJECTION ONCE
Refills: 0 | Status: COMPLETED | OUTPATIENT
Start: 2025-07-19 | End: 2025-07-19

## 2025-07-19 RX ORDER — KETOROLAC TROMETHAMINE 30 MG/ML
30 INJECTION, SOLUTION INTRAMUSCULAR; INTRAVENOUS ONCE
Refills: 0 | Status: DISCONTINUED | OUTPATIENT
Start: 2025-07-19 | End: 2025-07-19

## 2025-07-19 RX ORDER — ACETAMINOPHEN 500 MG/5ML
975 LIQUID (ML) ORAL ONCE
Refills: 0 | Status: COMPLETED | OUTPATIENT
Start: 2025-07-19 | End: 2025-07-19

## 2025-07-19 RX ADMIN — Medication 4 MILLIGRAM(S): at 17:01

## 2025-07-19 RX ADMIN — IOHEXOL 30 MILLILITER(S): 350 INJECTION, SOLUTION INTRAVENOUS at 17:01

## 2025-07-19 RX ADMIN — Medication 975 MILLIGRAM(S): at 17:01

## 2025-07-19 RX ADMIN — KETOROLAC TROMETHAMINE 30 MILLIGRAM(S): 30 INJECTION, SOLUTION INTRAMUSCULAR; INTRAVENOUS at 17:01

## 2025-07-19 RX ADMIN — Medication 20 MILLIGRAM(S): at 17:00

## 2025-07-19 RX ADMIN — SODIUM CHLORIDE 1400 MILLILITER(S): 9 INJECTION, SOLUTION INTRAVENOUS at 17:01
